# Patient Record
Sex: FEMALE | Race: BLACK OR AFRICAN AMERICAN | NOT HISPANIC OR LATINO | URBAN - METROPOLITAN AREA
[De-identification: names, ages, dates, MRNs, and addresses within clinical notes are randomized per-mention and may not be internally consistent; named-entity substitution may affect disease eponyms.]

---

## 2017-06-15 ENCOUNTER — EMERGENCY (EMERGENCY)
Facility: HOSPITAL | Age: 37
LOS: 1 days | Discharge: HOME | End: 2017-06-15
Attending: EMERGENCY MEDICINE | Admitting: EMERGENCY MEDICINE
Payer: COMMERCIAL

## 2017-06-15 VITALS
HEART RATE: 64 BPM | OXYGEN SATURATION: 100 % | SYSTOLIC BLOOD PRESSURE: 106 MMHG | DIASTOLIC BLOOD PRESSURE: 72 MMHG | RESPIRATION RATE: 15 BRPM | TEMPERATURE: 99 F

## 2017-06-15 VITALS
OXYGEN SATURATION: 100 % | SYSTOLIC BLOOD PRESSURE: 110 MMHG | HEIGHT: 63 IN | DIASTOLIC BLOOD PRESSURE: 60 MMHG | RESPIRATION RATE: 14 BRPM | WEIGHT: 130.07 LBS | TEMPERATURE: 98 F | HEART RATE: 74 BPM
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
## 2017-06-15 DIAGNOSIS — R51 HEADACHE: ICD-10-CM

## 2017-06-15 DIAGNOSIS — S13.4XXA SPRAIN OF LIGAMENTS OF CERVICAL SPINE, INITIAL ENCOUNTER: ICD-10-CM

## 2017-06-15 DIAGNOSIS — S09.90XA UNSPECIFIED INJURY OF HEAD, INITIAL ENCOUNTER: ICD-10-CM

## 2017-06-15 LAB — HCG UR QL: NEGATIVE — SIGNIFICANT CHANGE UP

## 2017-06-15 PROCEDURE — 72125 CT NECK SPINE W/O DYE: CPT | Mod: 26

## 2017-06-15 PROCEDURE — 72100 X-RAY EXAM L-S SPINE 2/3 VWS: CPT

## 2017-06-15 PROCEDURE — 81025 URINE PREGNANCY TEST: CPT

## 2017-06-15 PROCEDURE — 72100 X-RAY EXAM L-S SPINE 2/3 VWS: CPT | Mod: 26

## 2017-06-15 PROCEDURE — 70450 CT HEAD/BRAIN W/O DYE: CPT | Mod: 26

## 2017-06-15 PROCEDURE — 70450 CT HEAD/BRAIN W/O DYE: CPT

## 2017-06-15 PROCEDURE — 72125 CT NECK SPINE W/O DYE: CPT

## 2017-06-15 PROCEDURE — 99284 EMERGENCY DEPT VISIT MOD MDM: CPT

## 2017-06-15 PROCEDURE — 99284 EMERGENCY DEPT VISIT MOD MDM: CPT | Mod: 25

## 2017-06-15 RX ORDER — ACETAMINOPHEN 500 MG
650 TABLET ORAL ONCE
Qty: 0 | Refills: 0 | Status: COMPLETED | OUTPATIENT
Start: 2017-06-15 | End: 2017-06-15

## 2017-06-15 RX ORDER — CYCLOBENZAPRINE HYDROCHLORIDE 10 MG/1
1 TABLET, FILM COATED ORAL
Qty: 9 | Refills: 0 | OUTPATIENT
Start: 2017-06-15 | End: 2017-06-18

## 2017-06-15 RX ORDER — IBUPROFEN 200 MG
1 TABLET ORAL
Qty: 20 | Refills: 0 | OUTPATIENT
Start: 2017-06-15 | End: 2017-06-20

## 2017-06-15 RX ADMIN — Medication 650 MILLIGRAM(S): at 15:25
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
## 2017-06-15 NOTE — ED PROVIDER NOTE - PROGRESS NOTE DETAILS
results discussed, advised follow up with her gyn, copy of results given, advised follow up with pmd , and ortho
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
## 2024-05-17 NOTE — ED PROVIDER NOTE - CPE EDP GASTRO NORM
"Subjective   Patient ID: Kellen Anderson \"Vanessa\" is a 60 y.o. female who presents for Follow-up (Canker sore).    HPI  Pt here in acute visit.  She has new set of canker sores that started on Sunday.  She tells me she just traveled to [place].  She has two large ones under her bottom lip.  They are painful.  She has tried multiple otc remedies without relief.  She has tried mouth washes, antacids.      She had labs that did show mild positive YAHIR in 4/2024.  She doesn't have eye issues or joint pain that is abnormal for her age.  No skin rashes.  No genital sores.      Review of Systems   HENT:          Canker sore    Eyes:  Negative for photophobia, pain, discharge, redness, itching and visual disturbance.   Genitourinary:         No genital lesions    Musculoskeletal:  Negative for arthralgias and myalgias.       Objective   BP 99/60 (BP Location: Right arm, Patient Position: Sitting, BP Cuff Size: Adult)   Pulse 76   Wt 57.4 kg (126 lb 8 oz)   BMI 23.14 kg/m²    Physical Exam  Constitutional:       Appearance: Normal appearance.   HENT:      Mouth/Throat:      Comments: +2 canker sores noted undersurface of bottom lip   Neurological:      Mental Status: She is alert and oriented to person, place, and time.         Assessment/Plan   Problem List Items Addressed This Visit       Canker sores oral - Primary     ? If pt could have Bechet's as she gets canker sores often  Explained for this would recommend seeing rheum but she wishes to wait  Could be due to recent travel/sun exposure  Will give lidocaine solution to use as needed          Relevant Medications    lidocaine (Xylocaine) 2 % solution     " normal...